# Patient Record
Sex: MALE | Race: WHITE | Employment: FULL TIME | URBAN - METROPOLITAN AREA
[De-identification: names, ages, dates, MRNs, and addresses within clinical notes are randomized per-mention and may not be internally consistent; named-entity substitution may affect disease eponyms.]

---
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
## 2020-01-16 ENCOUNTER — OFFICE VISIT (OUTPATIENT)
Dept: PEDIATRICS CLINIC | Age: 20
End: 2020-01-16

## 2020-01-16 VITALS
BODY MASS INDEX: 34.57 KG/M2 | TEMPERATURE: 98.4 F | HEIGHT: 67 IN | HEART RATE: 60 BPM | RESPIRATION RATE: 16 BRPM | SYSTOLIC BLOOD PRESSURE: 135 MMHG | OXYGEN SATURATION: 97 % | WEIGHT: 220.25 LBS | DIASTOLIC BLOOD PRESSURE: 83 MMHG

## 2020-01-16 DIAGNOSIS — R50.9 FEVER, UNSPECIFIED FEVER CAUSE: ICD-10-CM

## 2020-01-16 DIAGNOSIS — J01.10 ACUTE NON-RECURRENT FRONTAL SINUSITIS: Primary | ICD-10-CM

## 2020-01-16 DIAGNOSIS — R68.89 FLU-LIKE SYMPTOMS: ICD-10-CM

## 2020-01-16 LAB
FLUAV+FLUBV AG NOSE QL IA.RAPID: NEGATIVE POS/NEG
FLUAV+FLUBV AG NOSE QL IA.RAPID: NEGATIVE POS/NEG
VALID INTERNAL CONTROL?: YES

## 2020-01-16 RX ORDER — LEVOFLOXACIN 500 MG/1
500 TABLET, FILM COATED ORAL DAILY
Qty: 10 TAB | Refills: 0 | Status: SHIPPED | OUTPATIENT
Start: 2020-01-16 | End: 2020-01-26

## 2020-01-16 NOTE — PATIENT INSTRUCTIONS
START Levaquin ONCE DAILY x 10 days Use Ibuprofen, 2-3 adult tabs every six hours, as needed, for pain and fever relief Increase fluid intake (water) REST Sinusitis in Teens: Care Instructions Your Care Instructions Sinusitis is an infection of the lining of the sinus cavities in your head. Sinusitis often follows a cold. It causes pain and pressure in your head and face. In most cases, sinusitis gets better on its own in 1 to 2 weeks. But some mild symptoms may last for several weeks. Sometimes antibiotics are needed. Follow-up care is a key part of your treatment and safety. Be sure to make and go to all appointments, and call your doctor if you are having problems. It's also a good idea to know your test results and keep a list of the medicines you take. How can you care for yourself at home? · Take an over-the-counter pain medicine, such as acetaminophen (Tylenol), ibuprofen (Advil, Motrin), or naproxen (Aleve). Be safe with medicines. Read and follow all instructions on the label. No one younger than 20 should take aspirin. It has been linked to Reye syndrome, a serious illness. · If the doctor prescribed antibiotics, take them as directed. Do not stop taking them just because you feel better. You need to take the full course of antibiotics. · Be careful when taking over-the-counter cold or flu medicines and Tylenol at the same time. Many of these medicines have acetaminophen, which is Tylenol. Read the labels to make sure that you are not taking more than the recommended dose. Too much acetaminophen (Tylenol) can be harmful. · Use a nasal spray medicine that relieves a stuffy nose. Do not use the medicine longer than the label says. · Breathe warm, moist air from a steamy shower, a hot bath, or a sink filled with hot water. Avoid cold, dry air. Using a humidifier in your home may help. Follow the directions for cleaning the machine. · Use saline (saltwater) nasal washes to help keep your nasal passages open and wash out mucus and bacteria. You can buy saline nose drops at a grocery store or drugstore. Or you can make your own at home by adding 1 teaspoon of salt and 1 teaspoon of baking soda to 2 cups of distilled water. If you make your own, fill a bulb syringe with the solution, insert the tip into your nostril, and squeeze gently. Teola Angelina your nose. · Put a hot, wet towel or a warm gel pack on your face 3 or 4 times a day for 5 to 10 minutes each time. When should you call for help? Call your doctor now or seek immediate medical care if: 
  · You have new or worse symptoms of infection, such as: 
? Increased pain, swelling, warmth, or redness. ? Red streaks leading from the area. ? Pus draining from the area. ? A fever.  
 Watch closely for changes in your health, and be sure to contact your doctor if: 
  · You are not getting better as expected. Where can you learn more? Go to http://jackie-carolynn.info/. Enter C556 in the search box to learn more about \"Sinusitis in Teens: Care Instructions. \" Current as of: October 21, 2018 Content Version: 12.2 © 0449-5881 [employer]. Care instructions adapted under license by [employer] (which disclaims liability or warranty for this information). If you have questions about a medical condition or this instruction, always ask your healthcare professional. [place] [address] any warranty or liability for your use of this information.

## 2020-01-16 NOTE — PROGRESS NOTES
HISTORY OF PRESENT ILLNESS  Vitaliy Paulino is a 21 y.o. male. HPI  Here today for cough, congestion, fever (low-grade). He has aches and sinus pressure, and he has a hx of wheezing and sinusitis. He feels dizzy if he is actively moving. He is sleeping more than usual.  He has a close friend who was recently dx with the flu. NKDA  Review of Systems   Constitutional: Positive for fever and malaise/fatigue. HENT: Positive for congestion and sinus pain. Negative for ear pain and sore throat. Eyes: Negative for discharge and redness. Respiratory: Positive for cough. Negative for shortness of breath and wheezing. Cardiovascular: Negative for chest pain and palpitations. Musculoskeletal: Positive for myalgias. Neurological: Positive for dizziness. Physical Exam  Vitals signs reviewed. Constitutional:       Appearance: Normal appearance. HENT:      Right Ear: Tympanic membrane normal.      Left Ear: Tympanic membrane normal.      Nose: Congestion present. No rhinorrhea. Right Sinus: Frontal sinus tenderness present. No maxillary sinus tenderness. Left Sinus: Frontal sinus tenderness present. No maxillary sinus tenderness. Cardiovascular:      Rate and Rhythm: Normal rate and regular rhythm. Heart sounds: Normal heart sounds. Pulmonary:      Effort: Pulmonary effort is normal.      Breath sounds: Normal breath sounds and air entry. No wheezing or rhonchi. Musculoskeletal:      Comments: No tenderness to deep palpation at legs, arms, back, or neck   Lymphadenopathy:      Cervical: No cervical adenopathy. Neurological:      Mental Status: He is alert. ASSESSMENT and PLAN    ICD-10-CM ICD-9-CM    1. Acute non-recurrent frontal sinusitis J01.10 461.1 levoFLOXacin (LEVAQUIN) 500 mg tablet   2.  Fever, unspecified fever cause R50.9 780.60 AMB POC DANNA INFLUENZA A/B TEST   3. Flu-like symptoms R68.89 780.99      Rapid Flu (-)    START Levaquin ONCE DAILY x 10 days    Use Ibuprofen, 2-3 adult tabs every six hours, as needed, for pain and fever relief    Increase fluid intake (water)    REST

## 2020-01-16 NOTE — PROGRESS NOTES
Vomiting twice, running warm (around 100s) Dizziness started yesterday, slight wheeze, congetion    Tylenol this morning    1. Have you been to the ER, urgent care clinic since your last visit? Hospitalized since your last visit? No    2. Have you seen or consulted any other health care providers outside of the [address] [place] [address] since your last visit? Include any pap smears or colon screening.  No    Chief Complaint   Patient presents with    Fever    Dizziness     Visit Vitals  /83   Pulse 60   Temp 98.4 °F (36.9 °C) (Oral)   Resp 16   Ht 5' 7\" (1.702 m)   Wt 220 lb 4 oz (99.9 kg)   SpO2 97%   BMI 34.50 kg/m²

## 2020-02-20 ENCOUNTER — OFFICE VISIT (OUTPATIENT)
Dept: PEDIATRICS CLINIC | Age: 20
End: 2020-02-20

## 2020-02-20 VITALS
TEMPERATURE: 98 F | OXYGEN SATURATION: 98 % | DIASTOLIC BLOOD PRESSURE: 76 MMHG | BODY MASS INDEX: 34.31 KG/M2 | RESPIRATION RATE: 16 BRPM | WEIGHT: 218.6 LBS | SYSTOLIC BLOOD PRESSURE: 122 MMHG | HEIGHT: 67 IN | HEART RATE: 62 BPM

## 2020-02-20 DIAGNOSIS — R06.2 WHEEZING: ICD-10-CM

## 2020-02-20 DIAGNOSIS — R05.9 COUGH: ICD-10-CM

## 2020-02-20 DIAGNOSIS — J45.31 MILD PERSISTENT ASTHMA WITH EXACERBATION: Primary | ICD-10-CM

## 2020-02-20 RX ORDER — PREDNISONE 20 MG/1
20 TABLET ORAL 2 TIMES DAILY
Qty: 10 TAB | Refills: 0 | Status: SHIPPED | OUTPATIENT
Start: 2020-02-20 | End: 2020-02-25

## 2020-02-20 RX ORDER — FLUTICASONE PROPIONATE 110 UG/1
2 AEROSOL, METERED RESPIRATORY (INHALATION) 2 TIMES DAILY
Qty: 1 INHALER | Refills: 0 | Status: SHIPPED | OUTPATIENT
Start: 2020-02-20

## 2020-02-20 NOTE — PROGRESS NOTES
Cheyenne Fernandez is a 21 y.o. male who comes in today alone. Chief Complaint   Patient presents with    Cough     since last week     [id number] and Dominick Alex is here with cough of 1 week duration with wheezing, chest tightness and postnasal drip. He has had nasal congestion without runny nose. Cough is described as dry without difficulty breathing. No associated eye redness, eye discharge, ear pain, sore throat, vomiting,   abdominal pain, diarrhea, urinary symptoms, rash, neck stiffness or lethargy. Symptoms are worsening. Siena Alejandro still has normal appetite and activity. No associated eye redness, eye discharge, ear pain, sore throat, vomiting,   abdominal pain, diarrhea, rash or lethargy. All other systems were reviewed and are negative. Siena Alejandro has history of daily marijuana use. Previous evaluation: none. Previous treatment: Proventil once to twice daily. PMH is significant for asthma/RAD and allergic rhinitis. Patient Active Problem List    Diagnosis Date Noted    Overweight 01/05/2016    Acne 11/07/2013    Keratosis pilaris 11/07/2013    Elbow fracture, left 05/14/2012    RAD (reactive airway disease) 03/30/2012    ADHD (attention deficit hyperactivity disorder) 04/19/2011    Stress disorder, acute 04/19/2011    School phobia 04/19/2011    Allergic rhinitis, cause unspecified 04/19/2011     Current Outpatient Medications   Medication Sig Dispense Refill    albuterol (PROVENTIL HFA, VENTOLIN HFA, PROAIR HFA) 90 mcg/actuation inhaler Take 2 Puffs by inhalation every four (4) hours as needed for Wheezing or Shortness of Breath.  1 Inhaler 3     No Known Allergies     Past Medical History:   Diagnosis Date    Asthma     Otitis media     Tubes placed at age 3.     Sinusitis 01/16/2020    Rx Levaquin    Sinusitis 08/22/2019    Rx Augmentin    Sinusitis 05/24/2017    Rx Augmentin    Sinusitis, left acute germán media 11/21/2019    Rx Levaquin    Wheezing 11/21/2019    Rx Prednisone     Past Surgical History:   Procedure Laterality Date    HX HEENT      Tubes in ears     PHYSICAL EXAMINATION  Visit Vitals  /76   Pulse 62   Temp 98 °F (36.7 °C) (Oral)   Resp 16   Ht 5' 7\" (1.702 m)   Wt 218 lb 9.6 oz (99.2 kg)   SpO2 98%   BMI 34.24 kg/m²     Constitutional: Active. Alert. No distress. HEENT: Normocephalic, no periorbital swelling, pink conjunctivae, anicteric sclerae,   normal TM's and external ear canals, no nasal flaring, no rhinorrhea, oropharynx clear. Neck: Supple, no masses or cervical lymphadenopathy. Lungs: No retractions, end expiratory wheezing over bilateral upper lung fields, no crackles. Heart: Normal rate, regular rhythm, S1 normal and S2 normal, no murmur heard. Abdomen:  Soft, good bowel sounds, non-tender, no masses or hepatosplenomegaly. Musculoskeletal: No gross deformities, no joint swelling, good cap refill, good pulses. Neuro:  No focal deficits, normal tone, no tremors, no meningeal signs. Skin: No rash. ASSESSMENT AND PLAN    ICD-10-CM ICD-9-CM    1. Mild persistent asthma with exacerbation J45.31 493.92 predniSONE (DELTASONE) 20 mg tablet      fluticasone propionate (FLOVENT HFA) 110 mcg/actuation inhaler   2. Cough R05 786.2    3. Wheezing R06.2 786.07      Discussed the diagnosis and management plan with Gaetana Burkitt. Continue Proventil MDI 2 inh q 4 hrs until cough and wheezing resolve then prn. Start Prednisone x 5 days. Start controller therapy with Flovent 110 mcg 2 inh BID. Encouraged to quit marijuana use. Reviewed worrisome symptoms to observe for especially S/S of respiratory distress. His questions were addressed, medication benefits and potential side effects were reviewed,   and he expressed understanding of what signs/symptoms for which they should call the office or return for visit or go to an ER. Handouts were provided with the After Visit Summary.      Follow-up and Dispositions    · Return in about 1 week (around 2/27/2020) for follow-up with Dr. Pelon Almeida or earlier as needed.

## 2020-02-20 NOTE — PATIENT INSTRUCTIONS
Cough: Care Instructions  Your Care Instructions    A cough is your body's response to something that bothers your throat or airways. Many things can cause a cough. You might cough because of a cold or the flu, bronchitis, or asthma. Smoking, postnasal drip, allergies, and stomach acid that backs up into your throat also can cause coughs. A cough is a symptom, not a disease. Most coughs stop when the cause, such as a cold, goes away. You can take a few steps at home to cough less and feel better. Follow-up care is a key part of your treatment and safety. Be sure to make and go to all appointments, and call your doctor if you are having problems. It's also a good idea to know your test results and keep a list of the medicines you take. How can you care for yourself at home? · Drink lots of water and other fluids. This helps thin the mucus and soothes a dry or sore throat. Honey or lemon juice in hot water or tea may ease a dry cough. · Take cough medicine as directed by your doctor. · Prop up your head on pillows to help you breathe and ease a dry cough. · Try cough drops to soothe a dry or sore throat. Cough drops don't stop a cough. Medicine-flavored cough drops are no better than candy-flavored drops or hard candy. · Do not smoke. Avoid secondhand smoke. If you need help quitting, talk to your doctor about stop-smoking programs and medicines. These can increase your chances of quitting for good. When should you call for help? Call 911 anytime you think you may need emergency care.  For example, call if:    · You have severe trouble breathing.    Call your doctor now or seek immediate medical care if:    · You cough up blood.     · You have new or worse trouble breathing.     · You have a new or higher fever.     · You have a new rash.    Watch closely for changes in your health, and be sure to contact your doctor if:    · You cough more deeply or more often, especially if you notice more mucus or a change in the color of your mucus.     · You have new symptoms, such as a sore throat, an earache, or sinus pain.     · You do not get better as expected. Where can you learn more? Go to http://jackie-carolynn.info/. Enter D279 in the search box to learn more about \"Cough: Care Instructions. \"  Current as of: June 9, 2019  Content Version: 12.2  © 1768-3553 [employer]. Care instructions adapted under license by [employer] (which disclaims liability or warranty for this information). If you have questions about a medical condition or this instruction, always ask your healthcare professional. [address] any warranty or liability for your use of this information. Learning About Asthma Triggers  What are asthma triggers? When you have asthma, certain things can make your symptoms worse. These are called triggers. Learn what triggers an asthma attack for you, and avoid the triggers when you can. Common triggers include colds, smoke, air pollution, dust, pollen, pets, stress, and cold air. How do asthma triggers affect you? Triggers can make it harder for your lungs to work as they should. They can lead to sudden breathing problems and other symptoms. When you are around a trigger, an asthma attack is more likely. If your symptoms are severe, you may need emergency treatment or have to go to the hospital for treatment. What can you do to avoid triggers? The first thing is to know your triggers. When you are having symptoms, note the things around you that might be causing them. Then look for patterns that may be triggering your symptoms. Record your triggers on a piece of paper or in an asthma diary. When you have your list of possible triggers, work with your doctor to find ways to avoid them. Avoid colds and flu. Get a pneumococcal vaccine shot. If you have had one before, ask your doctor whether you need a second dose.  Get a flu vaccine every year, as soon as it's available. If you must be around people with colds or the flu, wash your hands often. Here are some ways to avoid a few common triggers. · Do not smoke or allow others to smoke around you. If you need help quitting, talk to your doctor about stop-smoking programs and medicines. These can increase your chances of quitting for good. · If there is a lot of pollution, pollen, or dust outside, stay at home and keep your windows closed. Use an air conditioner or air filter in your home. Check your local weather report or newspaper for air quality and pollen reports. What else should you know? · Take your controller medicine every day, not just when you have symptoms. It helps prevent problems before they occur. · Your doctor may suggest that you check how well your lungs are working by measuring your peak expiratory flow (PEF) throughout the day. Your PEF may drop when you are near things that trigger symptoms. Where can you learn more? Go to http://jackie-carolynn.info/. Enter G252 in the search box to learn more about \"Learning About Asthma Triggers. \"  Current as of: June 9, 2019  Content Version: 12.2  © 3352-8244 [employer]. Care instructions adapted under license by [employer] (which disclaims liability or warranty for this information). If you have questions about a medical condition or this instruction, always ask your healthcare professional. [address] any warranty or liability for your use of this information.

## 2020-03-10 ENCOUNTER — OFFICE VISIT (OUTPATIENT)
Dept: URGENT CARE | Age: 20
End: 2020-03-10

## 2020-03-10 VITALS
DIASTOLIC BLOOD PRESSURE: 67 MMHG | OXYGEN SATURATION: 100 % | HEIGHT: 67 IN | HEART RATE: 76 BPM | SYSTOLIC BLOOD PRESSURE: 135 MMHG | TEMPERATURE: 98.4 F | RESPIRATION RATE: 18 BRPM | WEIGHT: 218 LBS | BODY MASS INDEX: 34.21 KG/M2

## 2020-03-10 DIAGNOSIS — J01.90 ACUTE BACTERIAL SINUSITIS: Primary | ICD-10-CM

## 2020-03-10 DIAGNOSIS — R52 BODY ACHES: ICD-10-CM

## 2020-03-10 DIAGNOSIS — B96.89 ACUTE BACTERIAL SINUSITIS: Primary | ICD-10-CM

## 2020-03-10 RX ORDER — LEVOCETIRIZINE DIHYDROCHLORIDE 5 MG/1
5 TABLET, FILM COATED ORAL DAILY
Qty: 30 TAB | Refills: 1 | Status: SHIPPED | OUTPATIENT
Start: 2020-03-10

## 2020-03-10 RX ORDER — AMOXICILLIN AND CLAVULANATE POTASSIUM 875; 125 MG/1; MG/1
1 TABLET, FILM COATED ORAL 2 TIMES DAILY
Qty: 14 TAB | Refills: 0 | Status: SHIPPED | OUTPATIENT
Start: 2020-03-10 | End: 2020-03-17

## 2020-03-10 NOTE — LETTER
NOTIFICATION RETURN TO WORK / SCHOOL 
 
3/10/2020 3:14 PM 
 
Mr. Rodrick Steinberg [address] [id number] To Whom It May Concern: 
 
Rodrick Steinberg is currently under the care of [facility]. He will return to work/school on: 03/11 OR 03/12/2020 If there are questions or concerns please have the patient contact our office. Sincerely, GHE PROVIDER

## 2020-03-10 NOTE — PROGRESS NOTES
20 yo male here  Was seen at another urgent care 2 weeks ago given prednisone this helped some but reports sinus pain, nasal congestion, post nasal drip and cough worsening over past 5 days. No fever or SOB  hasnt tried other meds  Overall worsening. No known exposures. Past Medical History:   Diagnosis Date    Asthma     Otitis media     Tubes placed at age 3.     Sinusitis 01/16/2020    Rx Levaquin    Sinusitis 08/22/2019    Rx Augmentin    Sinusitis 05/24/2017    Rx Augmentin    Sinusitis, left acute germán media 11/21/2019    Rx Levaquin    Wheezing 11/21/2019    Rx Prednisone        Past Surgical History:   Procedure Laterality Date    HX HEENT      Tubes in ears         History reviewed. No pertinent family history.      Social History     Socioeconomic History    Marital status: SINGLE     Spouse name: Not on file    Number of children: Not on file    Years of education: Not on file    Highest education level: Not on file   Occupational History    Not on file   Social Needs    Financial resource strain: Not on file    Food insecurity:     Worry: Not on file     Inability: Not on file    Transportation needs:     Medical: Not on file     Non-medical: Not on file   Tobacco Use    Smoking status: Current Some Day Smoker    Smokeless tobacco: Never Used   Substance and Sexual Activity    Alcohol use: No    Drug use: Not on file    Sexual activity: Not on file   Lifestyle    Physical activity:     Days per week: Not on file     Minutes per session: Not on file    Stress: Not on file   Relationships    Social connections:     Talks on phone: Not on file     Gets together: Not on file     Attends Zoroastrianism service: Not on file     Active member of club or organization: Not on file     Attends meetings of clubs or organizations: Not on file     Relationship status: Not on file    Intimate partner violence:     Fear of current or ex partner: Not on file     Emotionally abused: Not on file     Physically abused: Not on file     Forced sexual activity: Not on file   Other Topics Concern    Not on file   Social History Narrative    ** Merged History Encounter **                     ALLERGIES: Patient has no known allergies. Review of Systems   Gastrointestinal: Negative for diarrhea and vomiting. Skin: Negative for rash. All other systems reviewed and are negative. Vitals:    03/10/20 1433   BP: 135/67   Pulse: 76   Resp: 18   Temp: 98.4 °F (36.9 °C)   SpO2: 100%   Weight: 218 lb (98.9 kg)   Height: 5' 7\" (1.702 m)       Physical Exam  Vitals signs and nursing note reviewed. Constitutional:       General: He is not in acute distress. Appearance: He is not ill-appearing or diaphoretic. Comments: Non-toxic appearing, well hydrated   HENT:      Right Ear: Tympanic membrane and ear canal normal. There is no impacted cerumen. Left Ear: Tympanic membrane and ear canal normal. There is no impacted cerumen. Nose: Congestion and rhinorrhea present. Comments: Maxillary sinus TTP bilat. Mouth/Throat:      Mouth: Mucous membranes are moist.      Pharynx: No oropharyngeal exudate or posterior oropharyngeal erythema. Eyes:      General: No scleral icterus. Extraocular Movements: Extraocular movements intact. Conjunctiva/sclera: Conjunctivae normal.      Pupils: Pupils are equal, round, and reactive to light. Neck:      Musculoskeletal: Normal range of motion and neck supple. No neck rigidity. Cardiovascular:      Rate and Rhythm: Normal rate and regular rhythm. Heart sounds: Normal heart sounds. No murmur. No friction rub. No gallop. Pulmonary:      Effort: Pulmonary effort is normal. No respiratory distress. Breath sounds: Normal breath sounds. No stridor. No wheezing, rhonchi or rales. Lymphadenopathy:      Cervical: No cervical adenopathy. Skin:     General: Skin is warm and dry.       Capillary Refill: Capillary refill takes less than 2 seconds. Coloration: Skin is not pale. Findings: No erythema or rash. Neurological:      General: No focal deficit present. Mental Status: He is alert and oriented to person, place, and time. Psychiatric:         Mood and Affect: Mood normal.         Behavior: Behavior normal.         Thought Content: Thought content normal.         [facility]     Differential Diagnosis; Clinical Impression; Plan:       CLINICAL IMPRESSION:  (J01.90,  B96.89) Acute bacterial sinusitis  (primary encounter diagnosis)  (R52) Body aches    Orders Placed This Encounter      amoxicillin-clavulanate (AUGMENTIN) 875-125 mg per tablet          Sig: Take 1 Tab by mouth two (2) times a day for 7 days. Dispense:  14 Tab          Refill:  0      levocetirizine (XYZAL) 5 mg tablet          Sig: Take 1 Tab by mouth daily. Dispense:  30 Tab          Refill:  1      Plan:  Negative rapid flu test  Suspect sinus infection  Start augmentin  xyzal for post nasal drip  Review handouts       We have reviewed concerning signs/symptoms, normal vs abnormal progression of medical condition and when to seek immediate medical attention. Schedule with PCP or Urgent Care immediately for worsening or new symptoms. See your PCP if there is not at least some improvement in symptoms within the next 1 week  You should see your PCP for updates on your routine health maintenance.              Procedures

## 2020-03-10 NOTE — PATIENT INSTRUCTIONS
Results for orders placed or performed in visit on 03/10/20 AMB POC DANNA INFLUENZA A/B TEST Result Value Ref Range VALID INTERNAL CONTROL POC Yes Influenza A Ag POC Negative Negative Pos/Neg Influenza B Ag POC Negative Negative Pos/Neg Sinusitis: Care Instructions Your Care Instructions Sinusitis is an infection of the lining of the sinus cavities in your head. Sinusitis often follows a cold. It causes pain and pressure in your head and face. In most cases, sinusitis gets better on its own in 1 to 2 weeks. But some mild symptoms may last for several weeks. Sometimes antibiotics are needed. Follow-up care is a key part of your treatment and safety. Be sure to make and go to all appointments, and call your doctor if you are having problems. It's also a good idea to know your test results and keep a list of the medicines you take. How can you care for yourself at home? · Take an over-the-counter pain medicine, such as acetaminophen (Tylenol), ibuprofen (Advil, Motrin), or naproxen (Aleve). Read and follow all instructions on the label. · If the doctor prescribed antibiotics, take them as directed. Do not stop taking them just because you feel better. You need to take the full course of antibiotics. · Be careful when taking over-the-counter cold or flu medicines and Tylenol at the same time. Many of these medicines have acetaminophen, which is Tylenol. Read the labels to make sure that you are not taking more than the recommended dose. Too much acetaminophen (Tylenol) can be harmful. · Breathe warm, moist air from a steamy shower, a hot bath, or a sink filled with hot water. Avoid cold, dry air. Using a humidifier in your home may help. Follow the directions for cleaning the machine. · Use saline (saltwater) nasal washes to help keep your nasal passages open and wash out mucus and bacteria.  You can buy saline nose drops at a grocery store or drugstore. Or you can make your own at home by adding 1 teaspoon of salt and 1 teaspoon of baking soda to 2 cups of distilled water. If you make your own, fill a bulb syringe with the solution, insert the tip into your nostril, and squeeze gently. Izabella Avoca your nose. · Put a hot, wet towel or a warm gel pack on your face 3 or 4 times a day for 5 to 10 minutes each time. · Try a decongestant nasal spray like oxymetazoline (Afrin). Do not use it for more than 3 days in a row. Using it for more than 3 days can make your congestion worse. When should you call for help? Call your doctor now or seek immediate medical care if: 
  · You have new or worse swelling or redness in your face or around your eyes.  
  · You have a new or higher fever.  
 Watch closely for changes in your health, and be sure to contact your doctor if: 
  · You have new or worse facial pain.  
  · The mucus from your nose becomes thicker (like pus) or has new blood in it.  
  · You are not getting better as expected. Where can you learn more? Go to http://jackie-carolynn.info/. Enter A209 in the search box to learn more about \"Sinusitis: Care Instructions. \" Current as of: October 21, 2018 Content Version: 12.2 © 3643-7527 [employer]. Care instructions adapted under license by [employer] (which disclaims liability or warranty for this information). If you have questions about a medical condition or this instruction, always ask your healthcare professional. [place] [address] any warranty or liability for your use of this information.

## 2020-05-18 DIAGNOSIS — G47.9 SLEEP DIFFICULTIES: ICD-10-CM

## 2020-05-19 RX ORDER — TRAZODONE HYDROCHLORIDE 100 MG/1
TABLET ORAL
Qty: 30 TAB | Refills: 1 | Status: SHIPPED | OUTPATIENT
Start: 2020-05-19 | End: 2020-10-26

## 2020-07-02 ENCOUNTER — HOSPITAL ENCOUNTER (EMERGENCY)
Age: 20
Discharge: HOME | End: 2020-07-02
Attending: STUDENT IN AN ORGANIZED HEALTH CARE EDUCATION/TRAINING PROGRAM
Payer: COMMERCIAL

## 2020-07-02 ENCOUNTER — APPOINTMENT (OUTPATIENT)
Dept: CT IMAGING | Age: 20
End: 2020-07-02
Attending: STUDENT IN AN ORGANIZED HEALTH CARE EDUCATION/TRAINING PROGRAM
Payer: COMMERCIAL

## 2020-07-02 VITALS
OXYGEN SATURATION: 99 % | HEART RATE: 58 BPM | WEIGHT: 204.59 LBS | BODY MASS INDEX: 32.04 KG/M2 | RESPIRATION RATE: 18 BRPM | TEMPERATURE: 97.9 F | SYSTOLIC BLOOD PRESSURE: 139 MMHG | DIASTOLIC BLOOD PRESSURE: 99 MMHG

## 2020-07-02 DIAGNOSIS — R10.84 ABDOMINAL PAIN, GENERALIZED: Primary | ICD-10-CM

## 2020-07-02 LAB
ALBUMIN SERPL-MCNC: 4.2 G/DL (ref 3.5–5)
ALBUMIN/GLOB SERPL: 1.1 {RATIO} (ref 1.1–2.2)
ALP SERPL-CCNC: 78 U/L (ref 45–117)
ALT SERPL-CCNC: 21 U/L (ref 12–78)
ANION GAP SERPL CALC-SCNC: 4 MMOL/L (ref 5–15)
APPEARANCE UR: CLEAR
AST SERPL-CCNC: 18 U/L (ref 15–37)
BACTERIA URNS QL MICRO: NEGATIVE /HPF
BASOPHILS # BLD: 0.1 K/UL (ref 0–0.1)
BASOPHILS NFR BLD: 1 % (ref 0–1)
BILIRUB SERPL-MCNC: 1.2 MG/DL (ref 0.2–1)
BILIRUB UR QL: NEGATIVE
BUN SERPL-MCNC: 11 MG/DL (ref 6–20)
BUN/CREAT SERPL: 9 (ref 12–20)
CALCIUM SERPL-MCNC: 9.5 MG/DL (ref 8.5–10.1)
CHLORIDE SERPL-SCNC: 109 MMOL/L (ref 97–108)
CO2 SERPL-SCNC: 26 MMOL/L (ref 21–32)
COLOR UR: NORMAL
COMMENT, HOLDF: NORMAL
CREAT SERPL-MCNC: 1.18 MG/DL (ref 0.7–1.3)
DIFFERENTIAL METHOD BLD: NORMAL
EOSINOPHIL # BLD: 0.1 K/UL (ref 0–0.4)
EOSINOPHIL NFR BLD: 2 % (ref 0–7)
EPITH CASTS URNS QL MICRO: NORMAL /LPF
ERYTHROCYTE [DISTWIDTH] IN BLOOD BY AUTOMATED COUNT: 12.2 % (ref 11.5–14.5)
GLOBULIN SER CALC-MCNC: 3.8 G/DL (ref 2–4)
GLUCOSE SERPL-MCNC: 97 MG/DL (ref 65–100)
GLUCOSE UR STRIP.AUTO-MCNC: NEGATIVE MG/DL
HCT VFR BLD AUTO: 46.4 % (ref 36.6–50.3)
HGB BLD-MCNC: 15.4 G/DL (ref 12.1–17)
HGB UR QL STRIP: NEGATIVE
HYALINE CASTS URNS QL MICRO: NORMAL /LPF (ref 0–5)
IMM GRANULOCYTES # BLD AUTO: 0 K/UL (ref 0–0.04)
IMM GRANULOCYTES NFR BLD AUTO: 0 % (ref 0–0.5)
KETONES UR QL STRIP.AUTO: NEGATIVE MG/DL
LEUKOCYTE ESTERASE UR QL STRIP.AUTO: NEGATIVE
LIPASE SERPL-CCNC: 53 U/L (ref 73–393)
LYMPHOCYTES # BLD: 1.8 K/UL (ref 0.8–3.5)
LYMPHOCYTES NFR BLD: 25 % (ref 12–49)
MCH RBC QN AUTO: 29.9 PG (ref 26–34)
MCHC RBC AUTO-ENTMCNC: 33.2 G/DL (ref 30–36.5)
MCV RBC AUTO: 90.1 FL (ref 80–99)
MONOCYTES # BLD: 0.4 K/UL (ref 0–1)
MONOCYTES NFR BLD: 6 % (ref 5–13)
NEUTS SEG # BLD: 4.9 K/UL (ref 1.8–8)
NEUTS SEG NFR BLD: 66 % (ref 32–75)
NITRITE UR QL STRIP.AUTO: NEGATIVE
NRBC # BLD: 0 K/UL (ref 0–0.01)
NRBC BLD-RTO: 0 PER 100 WBC
PH UR STRIP: 6.5 [PH] (ref 5–8)
PLATELET # BLD AUTO: 280 K/UL (ref 150–400)
PMV BLD AUTO: 9.8 FL (ref 8.9–12.9)
POTASSIUM SERPL-SCNC: 4.2 MMOL/L (ref 3.5–5.1)
PROT SERPL-MCNC: 8 G/DL (ref 6.4–8.2)
PROT UR STRIP-MCNC: NEGATIVE MG/DL
RBC # BLD AUTO: 5.15 M/UL (ref 4.1–5.7)
RBC #/AREA URNS HPF: NORMAL /HPF (ref 0–5)
SAMPLES BEING HELD,HOLD: NORMAL
SODIUM SERPL-SCNC: 139 MMOL/L (ref 136–145)
SP GR UR REFRACTOMETRY: 1.02 (ref 1–1.03)
UROBILINOGEN UR QL STRIP.AUTO: 1 EU/DL (ref 0.2–1)
WBC # BLD AUTO: 7.3 K/UL (ref 4.1–11.1)
WBC URNS QL MICRO: NORMAL /HPF (ref 0–4)

## 2020-07-02 PROCEDURE — 80053 COMPREHEN METABOLIC PANEL: CPT

## 2020-07-02 PROCEDURE — 81001 URINALYSIS AUTO W/SCOPE: CPT

## 2020-07-02 PROCEDURE — 36415 COLL VENOUS BLD VENIPUNCTURE: CPT

## 2020-07-02 PROCEDURE — 74011000258 HC RX REV CODE- 258: Performed by: RADIOLOGY

## 2020-07-02 PROCEDURE — 83690 ASSAY OF LIPASE: CPT

## 2020-07-02 PROCEDURE — 74011636320 HC RX REV CODE- 636/320: Performed by: RADIOLOGY

## 2020-07-02 PROCEDURE — 85025 COMPLETE CBC W/AUTO DIFF WBC: CPT

## 2020-07-02 PROCEDURE — 74177 CT ABD & PELVIS W/CONTRAST: CPT

## 2020-07-02 PROCEDURE — 99283 EMERGENCY DEPT VISIT LOW MDM: CPT

## 2020-07-02 RX ORDER — FAMOTIDINE 20 MG/1
20 TABLET, FILM COATED ORAL
Qty: 30 TAB | Refills: 0 | Status: SHIPPED | OUTPATIENT
Start: 2020-07-02 | End: 2020-08-01

## 2020-07-02 RX ORDER — SODIUM CHLORIDE 0.9 % (FLUSH) 0.9 %
10 SYRINGE (ML) INJECTION
Status: COMPLETED | OUTPATIENT
Start: 2020-07-02 | End: 2020-07-02

## 2020-07-02 RX ADMIN — SODIUM CHLORIDE 100 ML: 900 INJECTION, SOLUTION INTRAVENOUS at 11:26

## 2020-07-02 RX ADMIN — Medication 10 ML: at 11:26

## 2020-07-02 RX ADMIN — IOPAMIDOL 100 ML: 755 INJECTION, SOLUTION INTRAVENOUS at 11:26

## 2020-07-02 NOTE — ED TRIAGE NOTES
Pt complains of lower abdominal pain with cramping for the last couple of days. Pt reports loose stools. Denies nausea and urinary pain.

## 2020-07-02 NOTE — ED PROVIDER NOTES
Patient is a 19-year-old male presenting to the emergency department today with abdominal pain. He states that 2 days ago he started with generalized abdominal pain after eating which is since progressed to lower abdominal pain. States that this morning he woke up and the pain was much more severe although it seems to wax and wane in severity. Nothing particular makes it better or worse. It does not radiate. He describes a sharp pain. Has had diarrhea/loose stools but no vomiting. No fevers or chills. No history of abdominal surgeries. No respiratory symptoms. No testicular pain or urinary symptoms. No other complaints at this time. Past Medical History:   Diagnosis Date    ADHD     Asthma     Otitis media     Tubes placed at age 3.     Sinusitis 01/16/2020    Rx Levaquin    Sinusitis 08/22/2019    Rx Augmentin    Sinusitis 05/24/2017    Rx Augmentin    Sinusitis, left acute germán media 11/21/2019    Rx Levaquin    Wheezing 11/21/2019    Rx Prednisone       Past Surgical History:   Procedure Laterality Date    HX HEENT      Tubes in ears         History reviewed. No pertinent family history.     Social History     Socioeconomic History    Marital status: SINGLE     Spouse name: Not on file    Number of children: Not on file    Years of education: Not on file    Highest education level: Not on file   Occupational History    Not on file   Social Needs    Financial resource strain: Not on file    Food insecurity     Worry: Not on file     Inability: Not on file    Transportation needs     Medical: Not on file     Non-medical: Not on file   Tobacco Use    Smoking status: Current Some Day Smoker    Smokeless tobacco: Never Used   Substance and Sexual Activity    Alcohol use: No    Drug use: Not on file    Sexual activity: Not on file   Lifestyle    Physical activity     Days per week: Not on file     Minutes per session: Not on file    Stress: Not on file   Relationships    Social connections     Talks on phone: Not on file     Gets together: Not on file     Attends Congregation service: Not on file     Active member of club or organization: Not on file     Attends meetings of clubs or organizations: Not on file     Relationship status: Not on file    Intimate partner violence     Fear of current or ex partner: Not on file     Emotionally abused: Not on file     Physically abused: Not on file     Forced sexual activity: Not on file   Other Topics Concern    Not on file   Social History Narrative    ** Merged History Encounter **              ALLERGIES: Patient has no known allergies. Review of Systems   Constitutional: Negative for chills and fever. HENT: Negative for congestion and sore throat. Eyes: Negative for pain and redness. Respiratory: Negative for cough and shortness of breath. Cardiovascular: Negative for chest pain and palpitations. Gastrointestinal: Positive for abdominal pain and diarrhea. Negative for nausea and vomiting. Genitourinary: Negative for frequency and hematuria. Musculoskeletal: Negative for back pain and neck pain. Skin: Negative for rash and wound. Neurological: Negative for dizziness and headaches. Hematological: Does not bruise/bleed easily. Vitals:    07/02/20 0942   BP: (!) 142/93   Pulse: 66   Resp: 18   Temp: 97.5 °F (36.4 °C)   SpO2: 99%   Weight: 92.8 kg (204 lb 9.4 oz)            Physical Exam  Vitals signs and nursing note reviewed. Constitutional:       General: He is not in acute distress. Appearance: He is well-developed. HENT:      Head: Normocephalic and atraumatic. Eyes:      Conjunctiva/sclera: Conjunctivae normal.      Pupils: Pupils are equal, round, and reactive to light. Neck:      Musculoskeletal: Normal range of motion and neck supple. Cardiovascular:      Rate and Rhythm: Normal rate and regular rhythm. Heart sounds: Normal heart sounds. No murmur. No friction rub. No gallop. Pulmonary:      Effort: Pulmonary effort is normal. No respiratory distress. Breath sounds: Normal breath sounds. No wheezing or rales. Abdominal:      General: Bowel sounds are normal. There is no distension. Palpations: Abdomen is soft. Tenderness: There is abdominal tenderness in the right lower quadrant. There is guarding. There is no rebound. Positive signs include McBurney's sign. Negative signs include Mancia's sign and Rovsing's sign. Musculoskeletal: Normal range of motion. Skin:     General: Skin is warm and dry. Capillary Refill: Capillary refill takes less than 2 seconds. Findings: No rash. Neurological:      Mental Status: He is alert and oriented to person, place, and time. Labs Reviewed:   No leukocytosis or anemia  Normal renal function  LFTs acceptable  Lipase not consistent with pancreatitis  Urinalysis not consistent with UTI or pyelonephritis    Imaging Reviewed: CT of the abdomen and pelvis read as no acute process      MDM:  26-year-old male here with generalized abdominal discomfort for the past several days however tender in the right lower quadrant. Labs not indicative of pancreatitis, biliary obstructive process, hepatitis, pyelonephritis or UTI. CT did not show perforation, abscess, appendicitis, diverticulitis, sbo, or other acute intra-abdominal pathology. Patient no acute distress with stable vital signs. Differential includes gastritis, enteritis, peptic ulcer disease. Advised to return if worsening symptoms and discharged home in stable condition. Clinical Impression:     ICD-10-CM ICD-9-CM    1.  Abdominal pain, generalized R10.84 789.07            Disposition: EVE Catherine DO

## 2020-07-06 ENCOUNTER — TELEPHONE (OUTPATIENT)
Dept: PEDIATRICS CLINIC | Age: 20
End: 2020-07-06

## 2020-07-06 NOTE — TELEPHONE ENCOUNTER
Attempted to contact pt to initiate virtual visit with provider.   No answer, left VM requesting call back to office

## 2020-08-07 ENCOUNTER — TELEPHONE (OUTPATIENT)
Dept: PEDIATRICS CLINIC | Age: 20
End: 2020-08-07

## 2020-08-07 NOTE — TELEPHONE ENCOUNTER
Verified /NAME - Patient called with concerns regarding possible covid exposure. He stated that his friend's mom's coworker is positiv. He has not been in personal contact and does not have sx. I explained to the patient that his exposure was minimal. The pt expressed his concern that his mom's boyfriend has lung cancer and he didn't want to expose him. I told him if he was concerned he could go to the various clinics in the area doing testing and have it done. Ирина Parham advised the patient on locations. The patient was very thankful.

## 2020-09-04 ENCOUNTER — HOSPITAL ENCOUNTER (EMERGENCY)
Age: 20
Discharge: HOME | End: 2020-09-04
Attending: PEDIATRICS
Payer: COMMERCIAL

## 2020-09-04 VITALS
DIASTOLIC BLOOD PRESSURE: 84 MMHG | HEART RATE: 58 BPM | OXYGEN SATURATION: 98 % | RESPIRATION RATE: 18 BRPM | SYSTOLIC BLOOD PRESSURE: 133 MMHG | TEMPERATURE: 97.8 F

## 2020-09-04 DIAGNOSIS — R10.13 ABDOMINAL PAIN, EPIGASTRIC: Primary | ICD-10-CM

## 2020-09-04 DIAGNOSIS — R11.0 NAUSEA: ICD-10-CM

## 2020-09-04 DIAGNOSIS — K21.9 GASTROESOPHAGEAL REFLUX DISEASE, ESOPHAGITIS PRESENCE NOT SPECIFIED: ICD-10-CM

## 2020-09-04 PROCEDURE — 74011000250 HC RX REV CODE- 250: Performed by: PEDIATRICS

## 2020-09-04 PROCEDURE — 99283 EMERGENCY DEPT VISIT LOW MDM: CPT

## 2020-09-04 PROCEDURE — 74011250637 HC RX REV CODE- 250/637: Performed by: PEDIATRICS

## 2020-09-04 RX ORDER — ONDANSETRON 4 MG/1
8 TABLET, ORALLY DISINTEGRATING ORAL
Status: COMPLETED | OUTPATIENT
Start: 2020-09-04 | End: 2020-09-04

## 2020-09-04 RX ORDER — ONDANSETRON 8 MG/1
8 TABLET, ORALLY DISINTEGRATING ORAL
Qty: 12 TAB | Refills: 0 | Status: SHIPPED | OUTPATIENT
Start: 2020-09-04

## 2020-09-04 RX ADMIN — ONDANSETRON 8 MG: 4 TABLET, ORALLY DISINTEGRATING ORAL at 01:04

## 2020-09-04 RX ADMIN — LIDOCAINE HYDROCHLORIDE 40 ML: 20 SOLUTION ORAL; TOPICAL at 01:06

## 2020-09-04 NOTE — LETTER
[address] 
[address] [facility] EMR DEPT 
[address] 
[address] [place] [address] Drive [address] [place] [address] 
[phone] Work/School Note Date: 9/4/2020 To Whom It May concern: 
 
Estrella Vyas was seen and treated today in the emergency room by the following provider(s): 
Attending Provider: Leonard Hoffman MD. Estrella Vyas may return to work on 9/5/2020.  
 
Sincerely, 
 
 
 
 
Milton Lott RN

## 2020-09-04 NOTE — DISCHARGE INSTRUCTIONS
Nausea and Vomiting: Care Instructions  Your Care Instructions     When you are nauseated, you may feel weak and sweaty and notice a lot of saliva in your mouth. Nausea often leads to vomiting. Most of the time you do not need to worry about nausea and vomiting, but they can be signs of other illnesses. Two common causes of nausea and vomiting are stomach flu and food poisoning. Nausea and vomiting from viral stomach flu will usually start to improve within 24 hours. Nausea and vomiting from food poisoning may last from 12 to 48 hours. The doctor has checked you carefully, but problems can develop later. If you notice any problems or new symptoms, get medical treatment right away. Follow-up care is a key part of your treatment and safety. Be sure to make and go to all appointments, and call your doctor if you are having problems. It's also a good idea to know your test results and keep a list of the medicines you take. How can you care for yourself at home? · To prevent dehydration, drink plenty of fluids, enough so that your urine is light yellow or clear like water. Choose water and other caffeine-free clear liquids until you feel better. If you have kidney, heart, or liver disease and have to limit fluids, talk with your doctor before you increase the amount of fluids you drink. · Rest in bed until you feel better. · When you are able to eat, try clear soups, mild foods, and liquids until all symptoms are gone for 12 to 48 hours. Other good choices include dry toast, crackers, cooked cereal, and gelatin dessert, such as Jell-O. When should you call for help? Call 911 anytime you think you may need emergency care. For example, call if:    · You passed out (lost consciousness). Call your doctor now or seek immediate medical care if:    · You have symptoms of dehydration, such as:  ? Dry eyes and a dry mouth. ? Passing only a little dark urine. ?  Feeling thirstier than usual.     · You have new or worsening belly pain.     · You have a new or higher fever.     · You vomit blood or what looks like coffee grounds. Watch closely for changes in your health, and be sure to contact your doctor if:    · You have ongoing nausea and vomiting.     · Your vomiting is getting worse.     · Your vomiting lasts longer than 2 days.     · You are not getting better as expected. Where can you learn more? Go to http://jackie-carolynn.info/  Enter H591 in the search box to learn more about \"Nausea and Vomiting: Care Instructions. \"  Current as of: June 26, 2019               Content Version: 12.6  © 4985-8818 [employer]. Care instructions adapted under license by [employer] (which disclaims liability or warranty for this information). If you have questions about a medical condition or this instruction, always ask your healthcare professional. [address] any warranty or liability for your use of this information. Gastroesophageal Reflux Disease (GERD): Care Instructions  Overview     Gastroesophageal reflux disease (GERD) is the backward flow of stomach acid into the esophagus. The esophagus is the tube that leads from your throat to your stomach. A one-way valve prevents the stomach acid from backing up into this tube. But when you have GERD, this valve does not close tightly enough. This can also cause pain and swelling in your esophagus. (This is called esophagitis.)  If you have mild GERD symptoms including heartburn, you may be able to control the problem with antacids or over-the-counter medicine. You can also make lifestyle changes to help reduce your symptoms. These include changing your diet and eating habits, such as not eating late at night and losing weight. Follow-up care is a key part of your treatment and safety. Be sure to make and go to all appointments, and call your doctor if you are having problems.  It's also a good idea to know your test results and keep a list of the medicines you take. How can you care for yourself at home? · Take your medicines exactly as prescribed. Call your doctor if you think you are having a problem with your medicine. · Your doctor may recommend over-the-counter medicine. For mild or occasional indigestion, antacids, such as Tums, Gaviscon, Mylanta, or Maalox, may help. Your doctor also may recommend over-the-counter acid reducers, such as famotidine (Pepcid AC), cimetidine (Tagamet HB), or omeprazole (Prilosec). Read and follow all instructions on the label. If you use these medicines often, talk with your doctor. · Change your eating habits. ? It's best to eat several small meals instead of two or three large meals. ? After you eat, wait 2 to 3 hours before you lie down. ? Chocolate, mint, and alcohol can make GERD worse. ? Spicy foods, foods that have a lot of acid (like tomatoes and oranges), and coffee can make GERD symptoms worse in some people. If your symptoms are worse after you eat a certain food, you may want to stop eating that food to see if your symptoms get better. · Do not smoke or chew tobacco. Smoking can make GERD worse. If you need help quitting, talk to your doctor about stop-smoking programs and medicines. These can increase your chances of quitting for good. · If you have GERD symptoms at night, raise the head of your bed 6 to 8 inches by putting the frame on blocks or placing a foam wedge under the head of your mattress. (Adding extra pillows does not work.)  · Do not wear tight clothing around your middle. · Lose weight if you need to. Losing just 5 to 10 pounds can help. When should you call for help? Call your doctor now or seek immediate medical care if:    · You have new or different belly pain.     · Your stools are black and tarlike or have streaks of blood.    Watch closely for changes in your health, and be sure to contact your doctor if:    · Your symptoms have not improved after 2 days.     · Food seems to catch in your throat or chest.   Where can you learn more? Go to http://jackie-carolynn.info/  Enter A747 in the search box to learn more about \"Gastroesophageal Reflux Disease (GERD): Care Instructions. \"  Current as of: April 15, 2020               Content Version: 12.6  © 4678-3257 [employer]. Care instructions adapted under license by [employer] (which disclaims liability or warranty for this information). If you have questions about a medical condition or this instruction, always ask your healthcare professional. [address] any warranty or liability for your use of this information.

## 2020-09-04 NOTE — ED NOTES
Pt discharged home with parent/guardian. Pt acting age appropriately, respirations regular and unlabored, cap refill less than two seconds. Skin pink, dry and warm. Lungs clear bilaterally. No further complaints at this time. Parent/guardian verbalized understanding of discharge paperwork and has no further questions at this time. Education provided about continuation of care, follow up care and medication administration: Take prescriptions as ordered by provider. Follow-up with GI to see if they can move up appointment. Follow-up with PCP. Return to ED for worsening symptoms . Parent/guardian able to provided teach back about discharge instructions.

## 2020-09-04 NOTE — ED TRIAGE NOTES
TRIAGE: Pt arrives with c/o abdominal pain and nausea. Pt has endoscopy scheduled for the 15th with GI due to progressing symptoms for last 2 months. \"I feel extremely bloated all over my stomach and I feel nauseous. I wake up every morning with the urge to have a BM, but have difficulty going. When I do have one, it looks like haney. \" Pt reports significant weight loss in last 2 months and decrease in food intake. Denies fevers, vomiting, difficulty urinating. Pt has prescription for omeprazole, is unaware of dosage. \"I took it 2 hours before I got here but I have not been taking it regularly. \"

## 2020-09-08 ENCOUNTER — HOSPITAL ENCOUNTER (EMERGENCY)
Age: 20
Discharge: HOME | End: 2020-09-08
Attending: EMERGENCY MEDICINE | Admitting: EMERGENCY MEDICINE
Payer: COMMERCIAL

## 2020-09-08 VITALS
SYSTOLIC BLOOD PRESSURE: 159 MMHG | BODY MASS INDEX: 31.95 KG/M2 | TEMPERATURE: 97.8 F | DIASTOLIC BLOOD PRESSURE: 114 MMHG | RESPIRATION RATE: 18 BRPM | HEART RATE: 82 BPM | WEIGHT: 204 LBS | OXYGEN SATURATION: 98 %

## 2020-09-08 DIAGNOSIS — F41.1 ANXIETY STATE: Primary | ICD-10-CM

## 2020-09-08 PROCEDURE — 99281 EMR DPT VST MAYX REQ PHY/QHP: CPT

## 2020-09-08 PROCEDURE — 90791 PSYCH DIAGNOSTIC EVALUATION: CPT

## 2020-09-08 RX ORDER — HYDROXYZINE 50 MG/1
50 TABLET, FILM COATED ORAL
Qty: 20 TAB | Refills: 0 | Status: SHIPPED | OUTPATIENT
Start: 2020-09-08 | End: 2020-09-13

## 2020-09-08 NOTE — ED PROVIDER NOTES
Date of Service:  9/8/2020    Patient:  Mark Iyer    Chief Complaint:  Depression and Anxiety       HPI:  Mark Iyer is a 21 y.o.  male who presents for evaluation of anxiety/depression. History of depression/anxiety, anxiety has been escalating, feels like it is taking over life/throughts, nonstop crying, no SI/HI. No plan. Self medicating with marijuana, used to be on zoloft and concerta, has not been on it x 3 years. Accompanied by mother. Past Medical History:   Diagnosis Date    ADHD     Asthma     Gastrointestinal disorder     Otitis media     Tubes placed at age 3.     Sinusitis 01/16/2020    Rx Levaquin    Sinusitis 08/22/2019    Rx Augmentin    Sinusitis 05/24/2017    Rx Augmentin    Sinusitis, left acute germán media 11/21/2019    Rx Levaquin    Wheezing 11/21/2019    Rx Prednisone       Past Surgical History:   Procedure Laterality Date    HX HEENT      Tubes in ears         History reviewed. No pertinent family history.     Social History     Socioeconomic History    Marital status: SINGLE     Spouse name: Not on file    Number of children: Not on file    Years of education: Not on file    Highest education level: Not on file   Occupational History    Not on file   Social Needs    Financial resource strain: Not on file    Food insecurity     Worry: Not on file     Inability: Not on file    Transportation needs     Medical: Not on file     Non-medical: Not on file   Tobacco Use    Smoking status: Former Smoker    Smokeless tobacco: Never Used   Substance and Sexual Activity    Alcohol use: No    Drug use: Yes     Types: Marijuana    Sexual activity: Not on file   Lifestyle    Physical activity     Days per week: Not on file     Minutes per session: Not on file    Stress: Not on file   Relationships    Social connections     Talks on phone: Not on file     Gets together: Not on file     Attends Hinduism service: Not on file     Active member of club or organization: Not on file     Attends meetings of clubs or organizations: Not on file     Relationship status: Not on file    Intimate partner violence     Fear of current or ex partner: Not on file     Emotionally abused: Not on file     Physically abused: Not on file     Forced sexual activity: Not on file   Other Topics Concern    Not on file   Social History Narrative    ** Merged History Encounter **              ALLERGIES: Patient has no known allergies. Review of Systems   Constitutional: Negative for chills and fever. HENT: Negative for congestion and sore throat. Eyes: Negative for pain. Respiratory: Negative for cough and shortness of breath. Cardiovascular: Negative for chest pain and palpitations. Gastrointestinal: Negative for abdominal pain, diarrhea, nausea and vomiting. Genitourinary: Negative for dysuria, frequency and urgency. Musculoskeletal: Negative for back pain and neck pain. Neurological: Negative for dizziness, light-headedness and headaches. Vitals:    09/08/20 1640   BP: (!) 159/114   Pulse: 82   Resp: 18   Temp: 97.8 °F (36.6 °C)   SpO2: 98%   Weight: 92.5 kg (204 lb)            Physical Exam  Vitals signs and nursing note reviewed. Constitutional:       Appearance: Normal appearance. Comments: Tearful in exam room   HENT:      Head: Normocephalic and atraumatic. Nose: Nose normal.   Eyes:      Extraocular Movements: Extraocular movements intact. Conjunctiva/sclera: Conjunctivae normal.      Pupils: Pupils are equal, round, and reactive to light. Neck:      Musculoskeletal: Normal range of motion and neck supple. Cardiovascular:      Rate and Rhythm: Normal rate and regular rhythm. Pulses: Normal pulses. Radial pulses are 2+ on the right side and 2+ on the left side. Posterior tibial pulses are 2+ on the right side and 2+ on the left side. Heart sounds: Normal heart sounds.    Pulmonary:      Effort: Pulmonary effort is normal. Breath sounds: Normal breath sounds. Abdominal:      General: Abdomen is flat. Bowel sounds are normal.      Palpations: Abdomen is soft. Musculoskeletal: Normal range of motion. Skin:     General: Skin is warm and dry. Capillary Refill: Capillary refill takes less than 2 seconds. Neurological:      General: No focal deficit present. Mental Status: He is alert and oriented to person, place, and time. Mental status is at baseline. Sensory: Sensation is intact. Motor: Motor function is intact. Psychiatric:         Mood and Affect: Mood normal.         Behavior: Behavior normal.         Thought Content: Thought content normal.          MDM  Number of Diagnoses or Management Options  Anxiety state:   Diagnosis management comments: DECISION MAKING:  Jocelyn Aguilar is a 21 y.o. male who comes in as above. Anxiety, worsening, no SI/HI. Evaluated by [facility], states does not currently meet admission criteria. Has appt with counselour in 48 hours, but requesting something to help with sleep/anxiety until then. Discussed atarax rx with patient, discussed any worsening or severe symptoms specifically any SI/HI return to ED. Follow up with psych as scheduled. IMPRESSION:  Anxiety state  (primary encounter diagnosis)    DISPOSITION:  Discharged      Discharge Medication List as of 9/8/2020  5:47 PM    START taking these medications    hydrOXYzine HCL (ATARAX) 50 mg tablet  Take 1 Tab by mouth every six (6) hours as needed for Anxiety for up to 5 days. , Print, Disp-20 Tab,R-0      CONTINUE these medications which have NOT CHANGED    OMEPRAZOLE PO  Take  by mouth., Historical Med    alum-mag hydroxide-simeth-lidocaine  Take 40 mL by mouth three (3) times daily as needed for Pain., Print, Disp-480 mL,R-0    ondansetron (ZOFRAN ODT) 8 mg disintegrating tablet  Take 1 Tab by mouth every eight (8) hours as needed for Nausea or Vomiting., Print, Disp-12 Tab,R-0    traZODone (DESYREL) 100 mg tablet  TAKE 1 TABLET BY MOUTH EVERY DAY AT NIGHT, Normal, Disp-30 Tab, R-1    levocetirizine (XYZAL) 5 mg tablet  Take 1 Tab by mouth daily. , Normal, Disp-30 Tab, R-1    fluticasone propionate (FLOVENT HFA) 110 mcg/actuation inhaler  Take 2 Puffs by inhalation two (2) times a day., Normal, Disp-1 Inhaler, R-0     albuterol (PROVENTIL HFA, VENTOLIN HFA, PROAIR HFA) 90 mcg/actuation inhaler   Take 2 Puffs by inhalation every four (4) hours as needed for Wheezing or Shortness of Breath., Normal, Disp-1 Inhaler, R-3           Follow-up Information     Follow up With Specialties Details Why Contact Info    Maryjane Gregory MD Pediatric Medicine Schedule an appointment as soon   as possible for a visit in 2 days  [address] [facility]  [address] [facility] [address]      [facility] [address] [facility] [address] [facility] [address] [facility] Emergency Medicine Go to  If symptoms worsen [id number] [facility] in 2 days Keep Thursday appointment at [address]  Call As needed, If symptoms worsen [phone]          The patient is asked to follow-up with their primary care provider in the next several days. They are to call tomorrow for an appointment. The patient is asked to return promptly for any increased concerns or worsening of symptoms. They can return to this emergency department or any other emergency department.            Procedures

## 2020-09-08 NOTE — BSMART NOTE
Comprehensive Assessment Form Part 1      Section I - Disposition    Axis I - Depression and Anxiety Unspecified, Marijuana Use Disorder   Axis II - Deferred  Axis III -   Past Medical History:   Diagnosis Date    ADHD     Asthma     Gastrointestinal disorder     Otitis media     Tubes placed at age 3.     Sinusitis 01/16/2020    Rx Levaquin    Sinusitis 08/22/2019    Rx Augmentin    Sinusitis 05/24/2017    Rx Augmentin    Sinusitis, left acute germán media 11/21/2019    Rx Levaquin    Wheezing 11/21/2019    Rx Prednisone          The Medical Doctor to Psychiatrist conference was not completed. The Medical Doctor is in agreement with Psychiatrist disposition because of (reason) Patient does not meet admission criteria nor does he want admission. The plan is discharge and patient has appointment at [address] on Thursday. Crisis number given and he will keep that appointment. .  The on-call Psychiatrist consulted was Dr. Gage Storm. The admitting Psychiatrist will be Dr. Gage Storm. The admitting Diagnosis is NA. The Payor source is [employer]. Section II - Integrated Summary  Summary:  Patient came in for mental health evaluation. Patient reported a history of depression and anxiety worse since COVID. Patient indicated his anxiety is taking over and he cannot get rid of it. Patient reported today he didn't feel like himself and felt lightheaded which prompted the ER visit. Patient also reported depression. Patient indicated in the past he has been on psychiatric medication but hasn't been in 3 years. Patient reported he was on Zoloft, an anxiety medication and ADHD medication. Patient stated \"now I smoke a lot of pot. \"  Patient reported multiple admissions at [facility] but clarified later he wasn't admitted but seen in the ER a few times and they told him they couldn't do anything.   When asked what he wants to be done, patient is requesting medication until he can go back and see [facility] on Thursday. Patient is alert, oriented, and cooperative. Speech is normal rate and volume. Patient is anxious,depressed, and tearful. Patient reported decreased appetite with significant weight loss, poor sleep accompanied by depression and anxiety. Patient denied hallucinations and denied suicidal and homicidal ideation or history of any attempts. Patient reported he has thoughts sometimes if he \"dropped\" would anyone notice or care. Patient reported his support is lacking and nobody in his home will talk to him. Patient stated \"I don't feel loved even though I am.\"      Patient is not requesting admission and doesn't require admission. He is asking for medication and this was discussed with medical provider in ER. She was willing to give him a few Atarax until his appointment later this week. Patient in agreement with the plan. Advised to call [address] [place] [address] or return if his situation changes. The patienthas demonstrated mental capacity to provide informed consent. The information is given by the patient. The Chief Complaint is anxiety and depression. The Precipitant Factors are COVID. Previous Hospitalizations: NA  The patient has not previously been in restraints. Current Psychiatrist and/or  is [address] [place] [address] appointment Thursday. Lethality Assessment:    The potential for suicide noted by the following: Patient denied and no history of attempts . The potential for homicide is not noted. The patient has not been a perpetrator of sexual or physical abuse. There are not pending charges. The patient is not felt to be at risk for self harm or harm to others. The attending nurse was advised that security has not been notified. Section III - Psychosocial  The patient's overall mood and attitude is depressed and anxious. Feelings of helplessness and hopelessness are observed by verbal statements. Generalized anxiety is not observed. Panic is not observed. Phobias are not observed. Obsessive compulsive tendencies are not observed. Section IV - Mental Status Exam  The patient's appearance shows no evidence of impairment. The patient's behavior shows no evidence of impairment. The patient is oriented to time, place, person and situation. The patient's speech shows no evidence of impairment. The patient's mood is depressed and is anxious. The range of affect is flat. The patient's thought content demonstrates no evidence of impairment. The thought process shows no evidence of impairment. The patient's perception shows no evidence of impairment. The patient's memory shows no evidence of impairment. The patient's appetite is decreased and shows signs of weight loss. The patient's sleep has evidence of insomnia. The patient shows no insight. The patient's judgement is psychologically impaired. Section V - Substance Abuse  The patient is using substances. The patient is using cannabis by inhalation for 1-5 years with last use on today. The patient has experienced the following withdrawal symptoms: N/A. Section VI - Living Arrangements  The patient is single. The patient lives with a parent. The patient has no children. The patient does plan to return home upon discharge. The patient does not have legal issues pending. The patient's source of income comes from employment. Moravian and cultural practices have not been voiced at this time. The patient's greatest support comes from none reported and this person will not be involved with the treatment. The patient has not been in an event described as horrible or outside the realm of ordinary life experience either currently or in the past.  The patient has not been a victim of sexual/physical abuse. Section VII - Other Areas of Clinical Concern  The highest grade achieved is HS with the overall quality of school experience being described as NA.   The patient is currently employed and speaks [place] as a primary language. The patient has no communication impairments affecting communication. The patient's preference for learning can be described as: can read and write adequately.   The patient's hearing is normal.  The patient's vision is normal.      [place], CAMMY

## 2020-09-08 NOTE — Clinical Note
[address] 
[address] Work/School Note Date: 9/8/2020 To Whom It May concern: 
 
Jocelyn Aguilar was seen and treated today in the emergency room by the following provider(s): 
Attending Provider: Jason Grady MD 
Physician Assistant: Geo Irwin PA-C. Jocelyn Aguilar is excused from work/school on 9/8/2020 through 9/11/2020. He is medically clear to return to work/school on 9/12/2020. Sincerely, Wilbert Valentin PA-C

## 2020-09-08 NOTE — ED TRIAGE NOTES
Pt c/o depression and anxiety. Pt reports his anxiety has increased recently causing him to have intrusive thoughts. Pt denies SI/HI but states he fears his intrusive thoughts may escalate to that. Reports self medicating with marijuana.

## 2020-09-10 ENCOUNTER — HOSPITAL ENCOUNTER (EMERGENCY)
Age: 20
Discharge: HOME | End: 2020-09-10
Attending: STUDENT IN AN ORGANIZED HEALTH CARE EDUCATION/TRAINING PROGRAM | Admitting: STUDENT IN AN ORGANIZED HEALTH CARE EDUCATION/TRAINING PROGRAM
Payer: COMMERCIAL

## 2020-09-10 VITALS
WEIGHT: 193.34 LBS | OXYGEN SATURATION: 97 % | SYSTOLIC BLOOD PRESSURE: 134 MMHG | BODY MASS INDEX: 30.28 KG/M2 | TEMPERATURE: 97.6 F | HEART RATE: 83 BPM | DIASTOLIC BLOOD PRESSURE: 72 MMHG | RESPIRATION RATE: 18 BRPM

## 2020-09-10 DIAGNOSIS — F41.8 ANXIETY ASSOCIATED WITH DEPRESSION: ICD-10-CM

## 2020-09-10 DIAGNOSIS — F12.20 MARIJUANA DEPENDENCE (HCC): Primary | ICD-10-CM

## 2020-09-10 PROCEDURE — 99282 EMERGENCY DEPT VISIT SF MDM: CPT

## 2020-09-10 PROCEDURE — 90791 PSYCH DIAGNOSTIC EVALUATION: CPT

## 2020-09-10 NOTE — DISCHARGE INSTRUCTIONS
Patient Education        Learning About Cannabis Use Disorder  What is cannabis use disorder? Cannabis is a drug that comes from the cannabis plant. Different forms of cannabis include marijuana, hashish, and hash oil. Some people who use cannabis develop cannabis use disorder. Cannabis use disorder means that a person uses cannabis even though it causes harm to themselves or others. The disorder can range from mild to severe. The more signs of this disorder you have, the more severe it may be. When it's severe, it's sometimes called addiction. People who have it may find it hard to control their use of cannabis. What are the signs? You may have cannabis use disorder if two or more of the following are true. The more signs of this disorder you have, the more severe it may be. · You use larger amounts of cannabis than you ever meant to. Or you've been using it for a longer period of time than you ever meant to. · You can't cut down or control your use. Or you constantly wish you could cut down. · You spend a lot of time getting or using cannabis or recovering from the effects. · You have strong cravings for cannabis. · You can no longer do your main jobs at work, school, or home. · You keep using even though your cannabis use is hurting your relationships. · You have stopped doing important activities because of your cannabis use. · You use cannabis in situations where doing so is dangerous. · You keep using cannabis even though you know it's causing physical or psychological health problems. · You need more and more cannabis to get the same effect, or you get less effect from the same amount over time. This is called tolerance. · You have uncomfortable symptoms when you stop using cannabis or use less. This is called withdrawal.  How is cannabis use disorder treated? Treatment may include group therapy, one or more types of counseling, and drug education.  Sometimes medicines are used to help manage symptoms. Treatment focuses on more than drug use. It helps you cope with the anger, frustration, sadness, and disappointment that often happen when a person tries to stop using drugs. Treatment also looks at other parts of your life. For example, how are your relationships with friends and family? What's going on at school and work? Do you have health problems? What is your living situation? Treatment helps you find and manage problems. It helps you take control of your life so you don't have to depend on drugs. A drug problem affects the whole family. Family counseling often is part of treatment. Follow-up care is a key part of your treatment and safety. Be sure to make and go to all appointments, and call your doctor if you are having problems. It's also a good idea to know your test results and keep a list of the medicines you take. Where can you learn more? Go to http://jackie-carolynn.info/  Enter A554 in the search box to learn more about \"Learning About Cannabis Use Disorder. \"  Current as of: June 29, 2020               Content Version: 12.6  © 0910-7374 Healthwise, Incorporated. Care instructions adapted under license by [employer] (which disclaims liability or warranty for this information). If you have questions about a medical condition or this instruction, always ask your healthcare professional. [place] [address] any warranty or liability for your use of this information. Patient Education        Learning About Anxiety Disorders  What are anxiety disorders? Anxiety disorders are a type of medical problem. They cause severe anxiety. When you feel anxious, you feel that something bad is about to happen. This feeling interferes with your life. These disorders include:  · Generalized anxiety disorder. You feel worried and stressed about many everyday events and activities.  This goes on for several months and disrupts your life on most days.  · Panic disorder. You have repeated panic attacks. A panic attack is a sudden, intense fear or anxiety. It may make you feel short of breath. Your heart may pound. · Social anxiety disorder. You feel very anxious about what you will say or do in front of people. For example, you may be scared to talk or eat in public. This problem affects your daily life. · Phobias. You are very scared of a specific object, situation, or activity. For example, you may fear spiders, high places, or small spaces. What are the symptoms? Generalized anxiety disorder  Symptoms may include:  · Feeling worried and stressed about many things almost every day. · Feeling tired or irritable. You may have a hard time concentrating. · Having headaches or muscle aches. · Having a hard time getting to sleep or staying asleep. Panic disorder  You may have repeated panic attacks when there is no reason for feeling afraid. You may change your daily activities because you worry that you will have another attack. Symptoms may include:  · Intense fear, terror, or anxiety. · Trouble breathing or very fast breathing. · Chest pain or tightness. · A heartbeat that races or is not regular. Social anxiety disorder  Symptoms may include:  · Fear about a social situation, such as eating in front of others or speaking in public. You may worry a lot. Or you may be afraid that something bad will happen. · Anxiety that can cause you to blush, sweat, and feel shaky. · A heartbeat that is faster than normal.  · A hard time focusing. Phobias  Symptoms may include:  · More fear than most people of being around an object, being in a situation, or doing an activity. You might also be stressed about the chance of being around the thing you fear. · Worry about losing control, panicking, fainting, or having physical symptoms like a faster heartbeat when you are around the situation or object. How are these disorders treated?   Anxiety disorders can be treated with medicines or counseling. A combination of both may be used. Medicines may include:  · Antidepressants. These may help your symptoms by keeping chemicals in your brain in balance. · Benzodiazepines. These may give you short-term relief of your symptoms. Some people use cognitive-behavioral therapy. A therapist helps you learn to change stressful or bad thoughts into helpful thoughts. Lead a healthy lifestyle  A healthy lifestyle may help you feel better. · Get at least 30 minutes of exercise on most days of the week. Walking is a good choice. · Eat a healthy diet. Include fruits, vegetables, lean proteins, and whole grains in your diet each day. · Try to go to bed at the same time every night. Try for 8 hours of sleep a night. · Find ways to manage stress. Try relaxation exercises. · Avoid alcohol and illegal drugs. Follow-up care is a key part of your treatment and safety. Be sure to make and go to all appointments, and call your doctor if you are having problems. It's also a good idea to know your test results and keep a list of the medicines you take. Where can you learn more? Go to http://jackie-carolynn.info/  Enter Q078 in the search box to learn more about \"Learning About Anxiety Disorders. \"  Current as of: January 31, 2020               Content Version: 12.6  © 3547-4834 [employer]. Care instructions adapted under license by [employer] (which disclaims liability or warranty for this information). If you have questions about a medical condition or this instruction, always ask your healthcare professional. [place] [address] any warranty or liability for your use of this information.

## 2020-09-10 NOTE — BSMART NOTE
Patient came in [facility] for anxiety. Patient was seen here on 9/9/20 and is back today. Patient indicated his anxiety is worse and the medication he was given here is not helping. Patient was supposed to have an appointment today with [facility] doctor and counselor. Patient reported he did see counselor, Albino Dubin, but could not see a doctor until next month. Patient reported he cant eat or sleep. Patient denied any hallucinations and there is no evidence of any. Patient stated \"I want to eat and sleep and be normal again. \"  Patient smokes marijuana frequently but reported he was trying to stop and hadn't since Tuesday. Patient denied suicidal ideation or attempts in past.  Patient denied homicidal ideation but reported he has the thought that in the future he doesn't want to harm anyone. Patient is to follow up with [facility]. Called [facility] also to see if any additional services could be implemented. Bhavik Tong took his information and will call him. He is not clearly meeting crisis stabilization criteria but she will assess further. Patient will be discharged to follow up with Armaan Moyer.

## 2020-09-10 NOTE — ED PROVIDER NOTES
Patient is a 59-year-old male presented emergency department with depression and anxiety. Patient was seen 2 days ago for similar symptoms and on chart reviewed patient's been seen multiple times for similar symptoms. Patient states that he stopped smoking marijuana 2 days ago where he was smoking on average 7 g daily he has been smoking heavily daily for the last 7 years. Patient states that he decided to stop smoking for health reasons. Patient states that he cannot stop crying he is extremely depressed and anxious when asked is he having any thoughts of hurting himself or others he says that he is having racing thoughts and is just afraid that these thoughts would cause him to hurt someone. Patient is never had or been required to be hospitalized for depression or anxiety patient has never had any SI or HI patient does not have access to firearms. Past Medical History:   Diagnosis Date    ADHD     Anxiety     Asthma     Depression     Gastrointestinal disorder     Otitis media     Tubes placed at age 3.     Sinusitis 01/16/2020    Rx Levaquin    Sinusitis 08/22/2019    Rx Augmentin    Sinusitis 05/24/2017    Rx Augmentin    Sinusitis, left acute germán media 11/21/2019    Rx Levaquin    Wheezing 11/21/2019    Rx Prednisone       Past Surgical History:   Procedure Laterality Date    HX HEENT      Tubes in ears         History reviewed. No pertinent family history.     Social History     Socioeconomic History    Marital status: SINGLE     Spouse name: Not on file    Number of children: Not on file    Years of education: Not on file    Highest education level: Not on file   Occupational History    Not on file   Social Needs    Financial resource strain: Not on file    Food insecurity     Worry: Not on file     Inability: Not on file    Transportation needs     Medical: Not on file     Non-medical: Not on file   Tobacco Use    Smoking status: Former Smoker    Smokeless tobacco: Never Used   Substance and Sexual Activity    Alcohol use: No    Drug use: Yes     Types: Marijuana    Sexual activity: Not on file   Lifestyle    Physical activity     Days per week: Not on file     Minutes per session: Not on file    Stress: Not on file   Relationships    Social connections     Talks on phone: Not on file     Gets together: Not on file     Attends Anabaptism service: Not on file     Active member of club or organization: Not on file     Attends meetings of clubs or organizations: Not on file     Relationship status: Not on file    Intimate partner violence     Fear of current or ex partner: Not on file     Emotionally abused: Not on file     Physically abused: Not on file     Forced sexual activity: Not on file   Other Topics Concern    Not on file   Social History Narrative    ** Merged History Encounter **              ALLERGIES: Patient has no known allergies. Review of Systems   Psychiatric/Behavioral: Positive for decreased concentration and sleep disturbance. All other systems reviewed and are negative. Vitals:    09/10/20 1609   BP: 134/72   Pulse: 83   Resp: 18   Temp: 97.6 °F (36.4 °C)   SpO2: 97%   Weight: 87.7 kg (193 lb 5.5 oz)            Physical Exam  Vitals signs and nursing note reviewed. Constitutional:       Appearance: He is well-developed. HENT:      Head: Normocephalic and atraumatic. Cardiovascular:      Rate and Rhythm: Normal rate and regular rhythm. Pulmonary:      Effort: Pulmonary effort is normal.      Breath sounds: Normal breath sounds. Skin:     General: Skin is warm. Neurological:      General: No focal deficit present. Mental Status: He is alert. Psychiatric:         Attention and Perception: Attention and perception normal.         Mood and Affect: Mood is depressed. Affect is tearful. MDM  Number of Diagnoses or Management Options  Diagnosis management comments: A/P: Marijuana withdrawal, anxiety.   25-year-old male presenting after stopping his recreational marijuana use/abuse patient tearful on exam states that he is extremely anxious, depressed be smart saw patient 2 days ago evaluated provided resources will have be smart reevaluate today. Patient Progress  Patient progress: stable         Procedures    Spoke to be smart provided resources to patient patient will be discharged.

## 2020-09-10 NOTE — ED TRIAGE NOTES
Triage: Pt reports last smoking weed on Tuesday night. Pt reports since then he has had abdominal pain and nausea, no vomiting. Pt has endoscopy scheduled for September 15th. Pt anxious and tearful during triage. Pt reports taking ODT Zofran today and feels \"like he needs to burp, bur can't. \"

## 2020-09-10 NOTE — ED NOTES
Discharge paperwork given to pt. All questions and concerns addressed at this time. Pt discharged home in no acute distress and acting age appropriate. Education given to pt about following up with PCP. Pt verbalized understanding and has no further questions at this time.

## 2020-09-14 ENCOUNTER — OFFICE VISIT (OUTPATIENT)
Dept: PEDIATRICS CLINIC | Age: 20
End: 2020-09-14
Payer: COMMERCIAL

## 2020-09-14 VITALS
OXYGEN SATURATION: 96 % | RESPIRATION RATE: 16 BRPM | SYSTOLIC BLOOD PRESSURE: 131 MMHG | HEIGHT: 67 IN | HEART RATE: 72 BPM | WEIGHT: 189.38 LBS | BODY MASS INDEX: 29.72 KG/M2 | TEMPERATURE: 97.2 F | DIASTOLIC BLOOD PRESSURE: 81 MMHG

## 2020-09-14 DIAGNOSIS — Z01.818 PRE-OP TESTING: ICD-10-CM

## 2020-09-14 DIAGNOSIS — F41.9 ANXIETY: Primary | ICD-10-CM

## 2020-09-14 PROCEDURE — 99000 SPECIMEN HANDLING OFFICE-LAB: CPT | Performed by: PEDIATRICS

## 2020-09-14 PROCEDURE — 99214 OFFICE O/P EST MOD 30 MIN: CPT | Performed by: PEDIATRICS

## 2020-09-14 RX ORDER — SERTRALINE HYDROCHLORIDE 50 MG/1
50 TABLET, FILM COATED ORAL DAILY
Qty: 30 TAB | Refills: 0 | Status: SHIPPED | OUTPATIENT
Start: 2020-09-14 | End: 2020-09-28 | Stop reason: SDUPTHER

## 2020-09-14 NOTE — PATIENT INSTRUCTIONS
START Sertraline, 50 mg tablets: 1 tablet EVERY MORNING, for 1 WEEK In 1 WEEK, if there is little or no improvement in anxiety symptoms, increase to 2 TABLETS, or 100 mg EVERY MORNING, for 1 WEEK RECHECK in office in 2 WEEKS 
 
(will get a referral for psychiatry then) Learning About Generalized Anxiety Disorder What is generalized anxiety disorder? We all worry. It's a normal part of life. But when you have generalized anxiety disorder, you worry about lots of things and have a hard time stopping your worry. This worry or anxiety interferes with your relationships, work, and life. What causes it? The cause is not known. But it may be passed down through families. What are the symptoms? You may feel anxious or worry most days about things like work, relationships, health, or money. You may worry about things that are unlikely to happen. You find it hard to stop or control the worry. Because you worry a lot and try hard to stop worrying, you may feel restless, tired, tense, or cranky. You may also find it hard to think or sleep. And you may have headaches or an upset stomach. How is it diagnosed? Your doctor will ask about your health and how often you worry or feel anxious. He or she may ask about other symptoms, like whether you: · Feel restless. · Feel tired. · Have a hard time thinking or feel that your mind goes blank. · Feel cranky. · Have tense muscles. · Have sleep problems. A physical exam and tests can help make sure that your symptoms aren't caused by a different condition, such as a thyroid problem. How is it treated? Counseling and medicine can both work to treat anxiety. The two are often used along with lifestyle changes. Cognitive-behavioral therapy (CBT) is a type of counseling that's used to help treat anxiety. In CBT, you learn how to notice and replace thoughts that make you feel worried. It also can help you learn how to relax when you worry. Medicines can help. These medicines are often also used for depression. Selective serotonin reuptake inhibitors (SSRIs) are often tried first. But there are other medicines that your doctor may use. You may need to try a few medicines to find one that works well. Many people feel better by getting regular exercise, eating healthy meals, and getting good sleep. Mindfulnessfocusing on things that happen in the present momentalso can help reduce your anxiety. What can you expect when you have it? Having anxiety can be upsetting. Some people might feel less worried and stressed after a couple of months of treatment. But for other people, it might take longer to feel better. Reaching out to people for help is important. Try not to isolate yourself. Let your family and friends help you. Find someone you can trust and confide in. Talk to that person. When you know what anxiety isand how you can get help for ityou can start to learn new ways of thinking. This can help you cope and work through your anxiety. Follow-up care is a key part of your treatment and safety. Be sure to make and go to all appointments, and call your doctor if you are having problems. It's also a good idea to know your test results and keep a list of the medicines you take. Where can you learn more? Go to http://jackie-carolynn.info/ Enter G110 in the search box to learn more about \"Learning About Generalized Anxiety Disorder. \" Current as of: January 31, 2020               Content Version: 12.6 © 0559-4306 [employer]. Care instructions adapted under license by [employer] (which disclaims liability or warranty for this information). If you have questions about a medical condition or this instruction, always ask your healthcare professional. [place] [address] any warranty or liability for your use of this information.

## 2020-09-14 NOTE — PROGRESS NOTES
HISTORY OF PRESENT ILLNESS  Rogelio Sparrow is a 21 y.o. male. HPI  Here today for worsening anxiety, he had been treated in the past by [facility] with Zoloft, 100 mg qday, but not in recent years. He uses marijuana regularly. He described difficulty making it here for this appointment due to anxiety. Has had ongoing issues with abdominal pain / weight loss, he is followed by adult GI, and has endoscopy pending in 2 weeks. NKDA    Review of Systems   Constitutional: Positive for weight loss. Negative for fever. Respiratory: Negative for shortness of breath and wheezing. Cardiovascular: Positive for chest pain. Gastrointestinal: Positive for abdominal pain. Negative for vomiting. Psychiatric/Behavioral: Positive for depression and substance abuse. Negative for suicidal ideas. The patient is nervous/anxious and has insomnia. Physical Exam  HENT:      Right Ear: Tympanic membrane normal.      Left Ear: Tympanic membrane normal.      Nose: Nose normal.      Mouth/Throat:      Lips: Pink. Mouth: Mucous membranes are moist.      Pharynx: Oropharynx is clear. Uvula midline. Cardiovascular:      Rate and Rhythm: Normal rate and regular rhythm. Heart sounds: Normal heart sounds. Neurological:      General: No focal deficit present. Psychiatric:         Attention and Perception: Attention normal.         Mood and Affect: Mood normal.         Speech: Speech normal.         ASSESSMENT and PLAN    ICD-10-CM ICD-9-CM    1.  Anxiety  F41.9 300.00 sertraline (ZOLOFT) 50 mg tablet       START Sertraline, 50 mg tablets: 1 tablet EVERY MORNING, for 1 WEEK    In 1 WEEK, if there is little or no improvement in anxiety symptoms, increase to 2 TABLETS, or 100 mg EVERY MORNING, for 1 WEEK    RECHECK in office in 2 WEEKS    (will get a referral for psychiatry then)

## 2020-09-14 NOTE — PROGRESS NOTES
For a while has noticed brown flaky mucous when smoking marijuana, no fever, no SOB    Has been to ER on 9/10, 9/08, 9/04 for anxiety concerns and marijuana use    Going to GI in a few weeks has concerns about what is safe/okay to eat, having GI upset and acid reflux with foods    1. Have you been to the ER, urgent care clinic since your last visit? Hospitalized since your last visit? No    2. Have you seen or consulted any other health care providers outside of the [address] [place] [address] since your last visit? Include any pap smears or colon screening.  No    Chief Complaint   Patient presents with    Cough    Anxiety     Visit Vitals  /81 (BP 1 Location: Left arm, BP Patient Position: Sitting)   Pulse 72   Temp 97.2 °F (36.2 °C) (Temporal)   Resp 16   Ht 5' 7.32\" (1.71 m)   Wt 189 lb 6 oz (85.9 kg)   SpO2 96%   BMI 29.38 kg/m²     3 most recent PHQ Screens 9/14/2020   Little interest or pleasure in doing things Nearly every day   Feeling down, depressed, irritable, or hopeless Nearly every day   Total Score PHQ 2 6   Trouble falling or staying asleep, or sleeping too much More than half the days   Feeling tired or having little energy More than half the days   Poor appetite, weight loss, or overeating Nearly every day   Feeling bad about yourself - or that you are a failure or have let yourself or your family down More than half the days   Trouble concentrating on things such as school, work, reading, or watching TV Nearly every day   How difficult have these problems made it for you to do your work, take care of your home and get along with others Extremely difficult

## 2020-09-15 PROBLEM — R63.4 ABNORMAL WEIGHT LOSS: Status: ACTIVE | Noted: 2020-09-15

## 2020-09-15 PROBLEM — R10.11 RUQ PAIN: Status: ACTIVE | Noted: 2020-09-15

## 2020-09-16 LAB — SARS-COV-2, NAA: NOT DETECTED

## 2020-09-28 ENCOUNTER — OFFICE VISIT (OUTPATIENT)
Dept: PEDIATRICS CLINIC | Age: 20
End: 2020-09-28
Payer: COMMERCIAL

## 2020-09-28 VITALS
WEIGHT: 192 LBS | BODY MASS INDEX: 30.13 KG/M2 | DIASTOLIC BLOOD PRESSURE: 68 MMHG | HEIGHT: 67 IN | TEMPERATURE: 98.3 F | HEART RATE: 65 BPM | RESPIRATION RATE: 16 BRPM | SYSTOLIC BLOOD PRESSURE: 114 MMHG | OXYGEN SATURATION: 96 %

## 2020-09-28 DIAGNOSIS — J45.20 RAD (REACTIVE AIRWAY DISEASE), MILD INTERMITTENT, UNCOMPLICATED: ICD-10-CM

## 2020-09-28 DIAGNOSIS — F41.9 ANXIETY: Primary | ICD-10-CM

## 2020-09-28 DIAGNOSIS — J01.91 ACUTE RECURRENT SINUSITIS, UNSPECIFIED LOCATION: ICD-10-CM

## 2020-09-28 PROCEDURE — 99214 OFFICE O/P EST MOD 30 MIN: CPT | Performed by: PEDIATRICS

## 2020-09-28 RX ORDER — LEVOFLOXACIN 500 MG/1
500 TABLET, FILM COATED ORAL DAILY
Qty: 10 TAB | Refills: 0 | Status: SHIPPED | OUTPATIENT
Start: 2020-09-28 | End: 2020-10-08

## 2020-09-28 RX ORDER — SERTRALINE HYDROCHLORIDE 50 MG/1
50 TABLET, FILM COATED ORAL DAILY
Qty: 30 TAB | Refills: 5 | Status: SHIPPED | OUTPATIENT
Start: 2020-09-28

## 2020-09-28 RX ORDER — ALBUTEROL SULFATE 90 UG/1
2 AEROSOL, METERED RESPIRATORY (INHALATION)
Qty: 1 INHALER | Refills: 3 | Status: SHIPPED | OUTPATIENT
Start: 2020-09-28 | End: 2021-10-25

## 2020-09-28 NOTE — PROGRESS NOTES
Sometimes feeling light-headed when taking sertaline    1. Have you been to the ER, urgent care clinic since your last visit? Hospitalized since your last visit? No    2. Have you seen or consulted any other health care providers outside of the [address] [place] [address] since your last visit? Include any pap smears or colon screening.  No    Chief Complaint   Patient presents with    Follow-up    Medication Evaluation     Visit Vitals  /68 (BP 1 Location: Left arm, BP Patient Position: Sitting)   Pulse 65   Temp 98.3 °F (36.8 °C) (Oral)   Resp 16   Ht 5' 7.36\" (1.711 m)   Wt 192 lb (87.1 kg)   SpO2 96%   BMI 29.75 kg/m²

## 2020-09-28 NOTE — PROGRESS NOTES
HISTORY OF PRESENT ILLNESS  Prema Nova is a 21 y.o. male. HPI  Here today for a few issues:  1) Anxiety: he was started on Sertraline, 50 mg qam a few weeks ago, he does report a slight decrease in anxiety overall. A referral with Peds Psychiatry will be provided today for ongoing medical mgmt. 2) sinus-pressure: he has a hx of recurrent sinusitis, he is starting to have similar sx again, with congestion and facial pressure  3)  Coughing: he has a hx of wheezing / asthma, and he is requesting a refill of albuterol for now, as his regular smoking (marijuana) is triggering his cough. NKDA    Review of Systems   Constitutional: Negative for fever. HENT: Positive for congestion and sinus pain. Respiratory: Positive for cough. Negative for shortness of breath and wheezing. Cardiovascular: Negative for chest pain and palpitations. Neurological: Negative for dizziness. Psychiatric/Behavioral: The patient does not have insomnia. Physical Exam  Vitals signs reviewed. Constitutional:       Appearance: Normal appearance. HENT:      Right Ear: Tympanic membrane normal.      Left Ear: Tympanic membrane normal.      Nose: Congestion present. Neck:      Musculoskeletal: Normal range of motion. Cardiovascular:      Rate and Rhythm: Normal rate and regular rhythm. Pulmonary:      Effort: Pulmonary effort is normal.      Breath sounds: Normal breath sounds and air entry. No wheezing or rales. Lymphadenopathy:      Cervical: No cervical adenopathy. Neurological:      Mental Status: He is alert. Psychiatric:         Attention and Perception: Attention normal.         Mood and Affect: Mood normal.         Speech: Speech normal.         ASSESSMENT and PLAN    ICD-10-CM ICD-9-CM    1. Anxiety  F41.9 300.00 REFERRAL TO PEDIATRIC PSYCHIATRY      sertraline (ZOLOFT) 50 mg tablet   2. Acute recurrent sinusitis, unspecified location  J01.91 461.9 levoFLOXacin (LEVAQUIN) 500 mg tablet   3.  RAD (reactive airway disease), mild intermittent, uncomplicated  F38.35 938.58 albuterol (PROVENTIL HFA, VENTOLIN HFA, PROAIR HFA) 90 mcg/actuation inhaler       START levaquin for sinusitis    Renewed Rx for Albuterol, 2 puffs every 4 hours, as needed, for wheeze or chest-tightness    Renewed Sertraline Rx, 1 tablet EVERY MORNING    Referral provided for Peds Psychiatry eval, with Dr. Yu Giposn

## 2020-09-28 NOTE — PATIENT INSTRUCTIONS
START levaquin for sinusitis Renewed Rx for Albuterol, 2 puffs every 4 hours, as needed, for wheeze or chest-tightness Renewed Sertraline Rx, 50 mg, 1 tablet EVERY MORNING Referral provided for Peds Psychiatry [place], with Dr. Mackenzie Samaniego Sinusitis: Care Instructions Your Care Instructions Sinusitis is an infection of the lining of the sinus cavities in your head. Sinusitis often follows a cold. It causes pain and pressure in your head and face. In most cases, sinusitis gets better on its own in 1 to 2 weeks. But some mild symptoms may last for several weeks. Sometimes antibiotics are needed. Follow-up care is a key part of your treatment and safety. Be sure to make and go to all appointments, and call your doctor if you are having problems. It's also a good idea to know your test results and keep a list of the medicines you take. How can you care for yourself at home? · Take an over-the-counter pain medicine, such as acetaminophen (Tylenol), ibuprofen (Advil, Motrin), or naproxen (Aleve). Read and follow all instructions on the label. · If the doctor prescribed antibiotics, take them as directed. Do not stop taking them just because you feel better. You need to take the full course of antibiotics. · Be careful when taking over-the-counter cold or flu medicines and Tylenol at the same time. Many of these medicines have acetaminophen, which is Tylenol. Read the labels to make sure that you are not taking more than the recommended dose. Too much acetaminophen (Tylenol) can be harmful. · Breathe warm, moist air from a steamy shower, a hot bath, or a sink filled with hot water. Avoid cold, dry air. Using a humidifier in your home may help. Follow the directions for cleaning the machine. · Use saline (saltwater) nasal washes to help keep your nasal passages open and wash out mucus and bacteria.  You can buy saline nose drops at a grocery store or drugstore. Or you can make your own at home by adding 1 teaspoon of salt and 1 teaspoon of baking soda to 2 cups of distilled water. If you make your own, fill a bulb syringe with the solution, insert the tip into your nostril, and squeeze gently. Andrea Crea your nose. · Put a hot, wet towel or a warm gel pack on your face 3 or 4 times a day for 5 to 10 minutes each time. · Try a decongestant nasal spray like oxymetazoline (Afrin). Do not use it for more than 3 days in a row. Using it for more than 3 days can make your congestion worse. When should you call for help? Call your doctor now or seek immediate medical care if: 
  · You have new or worse swelling or redness in your face or around your eyes.  
  · You have a new or higher fever. Watch closely for changes in your health, and be sure to contact your doctor if: 
  · You have new or worse facial pain.  
  · The mucus from your nose becomes thicker (like pus) or has new blood in it.  
  · You are not getting better as expected. Where can you learn more? Go to http://jackie-carolynn.info/ Enter R934 in the search box to learn more about \"Sinusitis: Care Instructions. \" Current as of: April 15, 2020               Content Version: 12.6 © 5009-1516 [employer]. Care instructions adapted under license by [employer] (which disclaims liability or warranty for this information). If you have questions about a medical condition or this instruction, always ask your healthcare professional. [place] [address] any warranty or liability for your use of this information.

## 2020-10-24 DIAGNOSIS — G47.9 SLEEP DIFFICULTIES: ICD-10-CM

## 2020-10-26 RX ORDER — TRAZODONE HYDROCHLORIDE 100 MG/1
TABLET ORAL
Qty: 30 TAB | Refills: 1 | Status: SHIPPED | OUTPATIENT
Start: 2020-10-26

## 2021-10-25 DIAGNOSIS — J45.20 RAD (REACTIVE AIRWAY DISEASE), MILD INTERMITTENT, UNCOMPLICATED: ICD-10-CM

## 2021-10-25 RX ORDER — ALBUTEROL SULFATE 90 UG/1
AEROSOL, METERED RESPIRATORY (INHALATION)
Qty: 6.7 EACH | Refills: 2 | Status: SHIPPED | OUTPATIENT
Start: 2021-10-25
